# Patient Record
Sex: MALE | Race: WHITE
[De-identification: names, ages, dates, MRNs, and addresses within clinical notes are randomized per-mention and may not be internally consistent; named-entity substitution may affect disease eponyms.]

---

## 2021-10-29 ENCOUNTER — HOSPITAL ENCOUNTER (INPATIENT)
Dept: HOSPITAL 95 - ER | Age: 46
LOS: 5 days | Discharge: HOME | DRG: 177 | End: 2021-11-03
Attending: INTERNAL MEDICINE | Admitting: HOSPITALIST
Payer: COMMERCIAL

## 2021-10-29 VITALS — BODY MASS INDEX: 33.14 KG/M2 | HEIGHT: 69 IN | WEIGHT: 223.77 LBS

## 2021-10-29 DIAGNOSIS — I16.1: ICD-10-CM

## 2021-10-29 DIAGNOSIS — Z88.0: ICD-10-CM

## 2021-10-29 DIAGNOSIS — Z98.890: ICD-10-CM

## 2021-10-29 DIAGNOSIS — F15.10: ICD-10-CM

## 2021-10-29 DIAGNOSIS — I13.0: ICD-10-CM

## 2021-10-29 DIAGNOSIS — N17.9: ICD-10-CM

## 2021-10-29 DIAGNOSIS — I50.21: ICD-10-CM

## 2021-10-29 DIAGNOSIS — I21.4: ICD-10-CM

## 2021-10-29 DIAGNOSIS — I50.810: ICD-10-CM

## 2021-10-29 DIAGNOSIS — Z79.899: ICD-10-CM

## 2021-10-29 DIAGNOSIS — N18.30: ICD-10-CM

## 2021-10-29 DIAGNOSIS — U07.1: Primary | ICD-10-CM

## 2021-10-29 DIAGNOSIS — I26.99: ICD-10-CM

## 2021-10-29 DIAGNOSIS — R18.8: ICD-10-CM

## 2021-10-29 DIAGNOSIS — E87.6: ICD-10-CM

## 2021-10-29 LAB
CK SERPL-CCNC: 217 U/L (ref 39–308)
PROTHROMBIN TIME: 13 SEC (ref 9.7–11.5)
SARS-COV-2 RNA RESP QL NAA+PROBE: POSITIVE
TROPONIN I SERPL-MCNC: 0.07 NG/ML (ref 0–0.04)

## 2021-10-29 PROCEDURE — A9270 NON-COVERED ITEM OR SERVICE: HCPCS

## 2021-10-29 PROCEDURE — 8E0ZXY6 ISOLATION: ICD-10-PCS | Performed by: INTERNAL MEDICINE

## 2021-10-29 PROCEDURE — U0004 COV-19 TEST NON-CDC HGH THRU: HCPCS

## 2021-10-30 LAB
ALBUMIN SERPL BCP-MCNC: 1.7 G/DL (ref 3.4–5)
ALBUMIN SERPL BCP-MCNC: 1.8 G/DL (ref 3.4–5)
ALBUMIN/GLOB SERPL: 0.4 {RATIO} (ref 0.8–1.8)
ALT SERPL W P-5'-P-CCNC: 139 U/L (ref 12–78)
ANION GAP SERPL CALCULATED.4IONS-SCNC: 5 MMOL/L (ref 6–16)
ANION GAP SERPL CALCULATED.4IONS-SCNC: 5 MMOL/L (ref 6–16)
AST SERPL W P-5'-P-CCNC: 36 U/L (ref 12–37)
BASOPHILS # BLD AUTO: 0.06 K/MM3 (ref 0–0.23)
BASOPHILS NFR BLD AUTO: 1 % (ref 0–2)
BILIRUB SERPL-MCNC: 1.4 MG/DL (ref 0.1–1)
BUN SERPL-MCNC: 21 MG/DL (ref 8–24)
BUN SERPL-MCNC: 21 MG/DL (ref 8–24)
CALCIUM SERPL-MCNC: 8.1 MG/DL (ref 8.5–10.1)
CALCIUM SERPL-MCNC: 8.5 MG/DL (ref 8.5–10.1)
CHLORIDE SERPL-SCNC: 101 MMOL/L (ref 98–108)
CHLORIDE SERPL-SCNC: 99 MMOL/L (ref 98–108)
CHOLEST SERPL-MCNC: 125 MG/DL (ref 50–200)
CHOLEST/HDLC SERPL: 3.8 {RATIO}
CK SERPL-CCNC: 189 U/L (ref 39–308)
CO2 SERPL-SCNC: 29 MMOL/L (ref 21–32)
CO2 SERPL-SCNC: 29 MMOL/L (ref 21–32)
CREAT SERPL-MCNC: 1.37 MG/DL (ref 0.6–1.2)
CREAT SERPL-MCNC: 1.44 MG/DL (ref 0.6–1.2)
DEPRECATED RDW RBC AUTO: 47.8 FL (ref 35.1–46.3)
EOSINOPHIL # BLD AUTO: 0.01 K/MM3 (ref 0–0.68)
EOSINOPHIL NFR BLD AUTO: 0 % (ref 0–6)
ERYTHROCYTE [DISTWIDTH] IN BLOOD BY AUTOMATED COUNT: 16.3 % (ref 11.7–14.2)
GLOBULIN SER CALC-MCNC: 4.3 G/DL (ref 2.2–4)
GLUCOSE SERPL-MCNC: 114 MG/DL (ref 70–99)
GLUCOSE SERPL-MCNC: 119 MG/DL (ref 70–99)
HCT VFR BLD AUTO: 37.3 % (ref 37–53)
HDLC SERPL-MCNC: 33 MG/DL (ref 39–?)
HGB BLD-MCNC: 12.1 G/DL (ref 13.5–17.5)
IMM GRANULOCYTES # BLD AUTO: 0.06 K/MM3 (ref 0–0.1)
IMM GRANULOCYTES NFR BLD AUTO: 1 % (ref 0–1)
LDLC SERPL CALC-MCNC: 81 MG/DL (ref 0–110)
LDLC/HDLC SERPL: 2.4 {RATIO}
LYMPHOCYTES # BLD AUTO: 1.99 K/MM3 (ref 0.84–5.2)
LYMPHOCYTES NFR BLD AUTO: 15 % (ref 21–46)
MAGNESIUM SERPL-MCNC: 1.7 MG/DL (ref 1.6–2.4)
MCHC RBC AUTO-ENTMCNC: 32.4 G/DL (ref 31.5–36.5)
MCV RBC AUTO: 81 FL (ref 80–100)
MONOCYTES # BLD AUTO: 1.62 K/MM3 (ref 0.16–1.47)
MONOCYTES NFR BLD AUTO: 12 % (ref 4–13)
NEUTROPHILS # BLD AUTO: 9.5 K/MM3 (ref 1.96–9.15)
NEUTROPHILS NFR BLD AUTO: 72 % (ref 41–73)
NRBC # BLD AUTO: 0 K/MM3 (ref 0–0.02)
NRBC BLD AUTO-RTO: 0 /100 WBC (ref 0–0.2)
PHOSPHATE SERPL-MCNC: 4 MG/DL (ref 2.5–4.9)
PLATELET # BLD AUTO: 447 K/MM3 (ref 150–400)
POTASSIUM SERPL-SCNC: 3.3 MMOL/L (ref 3.5–5.5)
POTASSIUM SERPL-SCNC: 3.5 MMOL/L (ref 3.5–5.5)
PROT SERPL-MCNC: 6.1 G/DL (ref 6.4–8.2)
PROT UR STRIP-MCNC: (no result) MG/DL
SODIUM SERPL-SCNC: 133 MMOL/L (ref 136–145)
SODIUM SERPL-SCNC: 135 MMOL/L (ref 136–145)
SP GR SPEC: 1 (ref 1–1.02)
TRIGL SERPL-MCNC: 57 MG/DL (ref 30–160)
TROPONIN I SERPL-MCNC: 0.04 NG/ML (ref 0–0.04)
TROPONIN I SERPL-MCNC: 0.07 NG/ML (ref 0–0.04)
UROBILINOGEN UR STRIP-MCNC: (no result) MG/DL
VLDLC SERPL CALC-MCNC: 11 MG/DL (ref 6–32)

## 2021-10-30 NOTE — NUR
BLOOD PRESSURE ELEVATED AND NOT RESPONDING TO HYDRALZINE. PHYSCIAN CALLED AND
ORDEREDS RECV'D. B/P NOT RESPONDING TO SECOND DOSE OF HYDRALZINE, RASHID DRIP
STARTED. TITRATED UP TO MAX DOSE.
MULTIPLE EDUCATION AND QUESTIONS ANSWERED CONCERNING DX AND TX PLAN THROUGH
NIGHT.
RESPONDED WELL TO LASIX WITH OVER 5000 ML URINE OUTPUT.

## 2021-10-30 NOTE — NUR
AOx4, denies P/N/V this shift, calm/cooperative, diet advanced to renal/2gm
salt restriction/1.5l fluid restriction tolerates well, ST 100s, BP HTN hard
to control between 140-160s this shift, nitro gtt attempt to titrate down
unsucessful despite bridging to PO antihypertensives coreg/lisinopril, nitro
lowest at 180mics/min, +2 pulses throughout, edema BLE +4 pitting, 40mg lasix
given, echo completed EF 20%, lungs clear/dim, denies SOB on RA, firm/rounded
abdomen, intermittant bowel tones non-tender, pt reports previous dark stool
before admit continue to monitor.

## 2021-10-31 LAB
ALBUMIN SERPL BCP-MCNC: 1.7 G/DL (ref 3.4–5)
AMPHETAMINES UR SCN-MCNC: DETECTED NG/ML
AMPHETAMINES UR-MCNC: DETECTED UG/L
ANION GAP SERPL CALCULATED.4IONS-SCNC: 5 MMOL/L (ref 6–16)
BASOPHILS # BLD AUTO: 0.03 K/MM3 (ref 0–0.23)
BASOPHILS NFR BLD AUTO: 0 % (ref 0–2)
BUN SERPL-MCNC: 20 MG/DL (ref 8–24)
CALCIUM SERPL-MCNC: 8.4 MG/DL (ref 8.5–10.1)
CHLORIDE SERPL-SCNC: 99 MMOL/L (ref 98–108)
CO2 SERPL-SCNC: 28 MMOL/L (ref 21–32)
CREAT SERPL-MCNC: 1.4 MG/DL (ref 0.6–1.2)
DEPRECATED RDW RBC AUTO: 48.3 FL (ref 35.1–46.3)
EOSINOPHIL # BLD AUTO: 0.04 K/MM3 (ref 0–0.68)
EOSINOPHIL NFR BLD AUTO: 0 % (ref 0–6)
ERYTHROCYTE [DISTWIDTH] IN BLOOD BY AUTOMATED COUNT: 16.4 % (ref 11.7–14.2)
GLUCOSE SERPL-MCNC: 101 MG/DL (ref 70–99)
HCT VFR BLD AUTO: 36.2 % (ref 37–53)
HGB BLD-MCNC: 11.6 G/DL (ref 13.5–17.5)
IMM GRANULOCYTES # BLD AUTO: 0.06 K/MM3 (ref 0–0.1)
IMM GRANULOCYTES NFR BLD AUTO: 0 % (ref 0–1)
LYMPHOCYTES # BLD AUTO: 1.47 K/MM3 (ref 0.84–5.2)
LYMPHOCYTES NFR BLD AUTO: 11 % (ref 21–46)
MAGNESIUM SERPL-MCNC: 1.7 MG/DL (ref 1.6–2.4)
MCHC RBC AUTO-ENTMCNC: 32 G/DL (ref 31.5–36.5)
MCV RBC AUTO: 82 FL (ref 80–100)
MONOCYTES # BLD AUTO: 1.54 K/MM3 (ref 0.16–1.47)
MONOCYTES NFR BLD AUTO: 11 % (ref 4–13)
NEUTROPHILS # BLD AUTO: 10.48 K/MM3 (ref 1.96–9.15)
NEUTROPHILS NFR BLD AUTO: 77 % (ref 41–73)
NRBC # BLD AUTO: 0 K/MM3 (ref 0–0.02)
NRBC BLD AUTO-RTO: 0 /100 WBC (ref 0–0.2)
PHOSPHATE SERPL-MCNC: 4 MG/DL (ref 2.5–4.9)
PLATELET # BLD AUTO: 383 K/MM3 (ref 150–400)
POTASSIUM SERPL-SCNC: 3.3 MMOL/L (ref 3.5–5.5)
SODIUM SERPL-SCNC: 132 MMOL/L (ref 136–145)
TROPONIN I SERPL-MCNC: 0.03 NG/ML (ref 0–0.04)

## 2021-10-31 NOTE — NUR
Review of pt with nursing will follow up with family after intervention and
plan of care from cardiology.

## 2021-10-31 NOTE — NUR
AOx4, denies P/N/V this shift, pupils 2mm, NSR 80/90s no ectopy noted, BP WNL
with oral meds, lasix given with moderate results, +3 edema still present BL
feet/ankle/legs, heparin gtt increased to 21units/hr, given 20meq for 3.3k,
continues 1l NC when sleeping, claer lungs all fields, denies SOB, audible
bowels/non-tender, awaiting stool sample for blood occult test, recent
reported Hx dark stools, LBM 10/29 per Pt, UDS+ for methamphetamines despite
Pt denying, admitted to recent use after the fact, planned angiogram in AM,
NPO at midnight, consent signed.

## 2021-10-31 NOTE — NUR
ASSUMED CARE
 
REPORT RECEIVED FROM DAY SHIFT RN. PT IN BED, AAOX4. PT DENIES ANY PAIN OR
DISCOMFORT. RESPIRATIONS EVEN AND UNLABORED, ON 2L NC. SUPPLEMENTAL OXYGEN FOR
COMFORT WHEN PT DOES HAVE COUGHING EPISODES. PT REMAINS ON HEPARIN GTT FOR PE.
HEPARIN GTT INFUSING AT 21UNITS/KG/HR. NO ACUTE DISTRESS NOTED. SEE SHIFT
ASSESSMENT.

## 2021-11-01 LAB
ALBUMIN SERPL BCP-MCNC: 1.5 G/DL (ref 3.4–5)
ANION GAP SERPL CALCULATED.4IONS-SCNC: 6 MMOL/L (ref 6–16)
BASOPHILS # BLD AUTO: 0.03 K/MM3 (ref 0–0.23)
BASOPHILS NFR BLD AUTO: 0 % (ref 0–2)
BUN SERPL-MCNC: 23 MG/DL (ref 8–24)
CALCIUM SERPL-MCNC: 8.3 MG/DL (ref 8.5–10.1)
CHLORIDE SERPL-SCNC: 99 MMOL/L (ref 98–108)
CO2 SERPL-SCNC: 29 MMOL/L (ref 21–32)
CREAT SERPL-MCNC: 1.45 MG/DL (ref 0.6–1.2)
DEPRECATED RDW RBC AUTO: 49.1 FL (ref 35.1–46.3)
EOSINOPHIL # BLD AUTO: 0.09 K/MM3 (ref 0–0.68)
EOSINOPHIL NFR BLD AUTO: 1 % (ref 0–6)
ERYTHROCYTE [DISTWIDTH] IN BLOOD BY AUTOMATED COUNT: 16.7 % (ref 11.7–14.2)
GLUCOSE SERPL-MCNC: 99 MG/DL (ref 70–99)
HCT VFR BLD AUTO: 36.5 % (ref 37–53)
HGB BLD-MCNC: 11.8 G/DL (ref 13.5–17.5)
IMM GRANULOCYTES # BLD AUTO: 0.04 K/MM3 (ref 0–0.1)
IMM GRANULOCYTES NFR BLD AUTO: 0 % (ref 0–1)
LYMPHOCYTES # BLD AUTO: 1.77 K/MM3 (ref 0.84–5.2)
LYMPHOCYTES NFR BLD AUTO: 12 % (ref 21–46)
MAGNESIUM SERPL-MCNC: 1.8 MG/DL (ref 1.6–2.4)
MCHC RBC AUTO-ENTMCNC: 32.3 G/DL (ref 31.5–36.5)
MCV RBC AUTO: 82 FL (ref 80–100)
MONOCYTES # BLD AUTO: 2.04 K/MM3 (ref 0.16–1.47)
MONOCYTES NFR BLD AUTO: 14 % (ref 4–13)
NEUTROPHILS # BLD AUTO: 10.82 K/MM3 (ref 1.96–9.15)
NEUTROPHILS NFR BLD AUTO: 73 % (ref 41–73)
NRBC # BLD AUTO: 0 K/MM3 (ref 0–0.02)
NRBC BLD AUTO-RTO: 0 /100 WBC (ref 0–0.2)
PHOSPHATE SERPL-MCNC: 4.3 MG/DL (ref 2.5–4.9)
PLATELET # BLD AUTO: 430 K/MM3 (ref 150–400)
POTASSIUM SERPL-SCNC: 3.4 MMOL/L (ref 3.5–5.5)
SODIUM SERPL-SCNC: 134 MMOL/L (ref 136–145)

## 2021-11-01 NOTE — NUR
ASSUMED CARE OF PATIENT THIS AM AT APPROX 0700. PT ALERT, ORIENTED x4; CALM
AND COOPERATIVE WITH CARE. PT RESTING IN BED, 1 PERSON ASSIST TO BSC. PT
DENIES PAIN, CHEST PAIN, AND NAUSEA. PT REPORTS NONPRODUCTIVE COUGH, STATES
FEELS DIZZINESS AND PAIN WITH COUGHING. SPO2 >90% ON 2L O2 VIA NC. SWELLING
NOTED TO BLE FROM ANKLE TO HIP AND GENITALIA, PT RECEIVING LASIX. NEEDING
STOOL SAMPLE FOR GUAIAC, PRIOR TO ANGIO, PT GIVEN PRUNE JUICE PER REQUEST.
VSS. NO OTHER ACUTE CHANGES NOTED. WILL CONTINUE TO MONITOR UNTIL REPORT GIVEN
TO ONCOMING RN.

## 2021-11-02 LAB
ALBUMIN SERPL BCP-MCNC: 1.4 G/DL (ref 3.4–5)
ANION GAP SERPL CALCULATED.4IONS-SCNC: 7 MMOL/L (ref 6–16)
BASOPHILS # BLD AUTO: 0.03 K/MM3 (ref 0–0.23)
BASOPHILS NFR BLD AUTO: 0 % (ref 0–2)
BUN SERPL-MCNC: 26 MG/DL (ref 8–24)
CALCIUM SERPL-MCNC: 8.2 MG/DL (ref 8.5–10.1)
CHLORIDE SERPL-SCNC: 99 MMOL/L (ref 98–108)
CO2 SERPL-SCNC: 30 MMOL/L (ref 21–32)
CREAT SERPL-MCNC: 1.49 MG/DL (ref 0.6–1.2)
DEPRECATED RDW RBC AUTO: 48.9 FL (ref 35.1–46.3)
EOSINOPHIL # BLD AUTO: 0.1 K/MM3 (ref 0–0.68)
EOSINOPHIL NFR BLD AUTO: 1 % (ref 0–6)
ERYTHROCYTE [DISTWIDTH] IN BLOOD BY AUTOMATED COUNT: 16.5 % (ref 11.7–14.2)
GLUCOSE SERPL-MCNC: 97 MG/DL (ref 70–99)
HCT VFR BLD AUTO: 37 % (ref 37–53)
HGB BLD-MCNC: 11.7 G/DL (ref 13.5–17.5)
IMM GRANULOCYTES # BLD AUTO: 0.07 K/MM3 (ref 0–0.1)
IMM GRANULOCYTES NFR BLD AUTO: 1 % (ref 0–1)
LYMPHOCYTES # BLD AUTO: 2.16 K/MM3 (ref 0.84–5.2)
LYMPHOCYTES NFR BLD AUTO: 15 % (ref 21–46)
MCHC RBC AUTO-ENTMCNC: 31.6 G/DL (ref 31.5–36.5)
MCV RBC AUTO: 82 FL (ref 80–100)
MONOCYTES # BLD AUTO: 1.88 K/MM3 (ref 0.16–1.47)
MONOCYTES NFR BLD AUTO: 13 % (ref 4–13)
NEUTROPHILS # BLD AUTO: 10.59 K/MM3 (ref 1.96–9.15)
NEUTROPHILS NFR BLD AUTO: 71 % (ref 41–73)
NRBC # BLD AUTO: 0 K/MM3 (ref 0–0.02)
NRBC BLD AUTO-RTO: 0 /100 WBC (ref 0–0.2)
PHOSPHATE SERPL-MCNC: 3.9 MG/DL (ref 2.5–4.9)
PLATELET # BLD AUTO: 470 K/MM3 (ref 150–400)
POTASSIUM SERPL-SCNC: 3.4 MMOL/L (ref 3.5–5.5)
SODIUM SERPL-SCNC: 136 MMOL/L (ref 136–145)

## 2021-11-02 NOTE — NUR
TRANSFER NOTE
REPORT GIVEN TO TIA RN APPROX 1840. THIS NURSE EDUCATED PT ON MEDICATION
REGIMEN AND DIET PER PATIENT REQUEST. HANDOUTS SENT UPSTAIRS WITH PATIENT. ALL
OF PATIENT BELONGINGS WERE SENT WITH PATIENT. VITAL SIGNS REMAINED STABLE
THROUGHOUT SHIFT. PT TRANSFERED VIA WHEELCHAIR, 2L O2 VIA NASAL CANNULA WITH
BARBIE CNA.

## 2021-11-02 NOTE — NUR
returned call to [facility] patients SO. Review of pt needs with nursing. Pt to DC
will folllow up with SO on his disease process and education so she can hlep
him with his care.

## 2021-11-02 NOTE — NUR
ASSSUMPTION OF CARE NOTE
PT IS ALERT AND ORIENTED X 4. SPO2 IN MID 90'S ON ROOM AIR. PT UTILIZES
BEDSIDE URINAL, BOTH LEFT AND RIGHT AC IV'S ARE SALINE LOCKED. PT DENIED CHEST
PAIN/PRESSURE. WILL CONTINUE TO MONITOR. BED IN LOW, CALL LIGHT IN REACH.

## 2021-11-02 NOTE — NUR
SHIFT SUMMARY
ASSUMED CARE OF PT AT 1900. PT IS A/OX4. HEART SOUDS REGULAR. PT HAS SWELLING
IN HIS LEGS.  LUNG SOUNDS ARE VERY DIMINISHED AND TIGHT AT THE BASES. PT
THINKS ITS FROM ALL THE FLUID IN HIS LUNGS. PT WAS ON 2L NC AT THE BEGINNING
OF THE SHIFT BUT NOW HE IS ON RA WITH SATURATIONS ABOVE 95%.PT USED THE URINAL
T/O THE NIGHT. PT HAS BEEN NPO SINCE MIDNIGHT.
 
CALL LIGHT IN REACH, BED IN LOWEST POSTION.

## 2021-11-03 LAB
ALBUMIN SERPL BCP-MCNC: 1.5 G/DL (ref 3.4–5)
ANION GAP SERPL CALCULATED.4IONS-SCNC: 6 MMOL/L (ref 6–16)
BASOPHILS # BLD AUTO: 0.04 K/MM3 (ref 0–0.23)
BASOPHILS NFR BLD AUTO: 0 % (ref 0–2)
BUN SERPL-MCNC: 27 MG/DL (ref 8–24)
CALCIUM SERPL-MCNC: 8.5 MG/DL (ref 8.5–10.1)
CHLORIDE SERPL-SCNC: 99 MMOL/L (ref 98–108)
CO2 SERPL-SCNC: 30 MMOL/L (ref 21–32)
CREAT SERPL-MCNC: 1.56 MG/DL (ref 0.6–1.2)
DEPRECATED RDW RBC AUTO: 49.1 FL (ref 35.1–46.3)
EOSINOPHIL # BLD AUTO: 0.15 K/MM3 (ref 0–0.68)
EOSINOPHIL NFR BLD AUTO: 1 % (ref 0–6)
ERYTHROCYTE [DISTWIDTH] IN BLOOD BY AUTOMATED COUNT: 16.5 % (ref 11.7–14.2)
GLUCOSE SERPL-MCNC: 140 MG/DL (ref 70–99)
HCT VFR BLD AUTO: 37.6 % (ref 37–53)
HGB BLD-MCNC: 12 G/DL (ref 13.5–17.5)
IMM GRANULOCYTES # BLD AUTO: 0.06 K/MM3 (ref 0–0.1)
IMM GRANULOCYTES NFR BLD AUTO: 1 % (ref 0–1)
LYMPHOCYTES # BLD AUTO: 2.16 K/MM3 (ref 0.84–5.2)
LYMPHOCYTES NFR BLD AUTO: 17 % (ref 21–46)
MCHC RBC AUTO-ENTMCNC: 31.9 G/DL (ref 31.5–36.5)
MCV RBC AUTO: 83 FL (ref 80–100)
MONOCYTES # BLD AUTO: 1.39 K/MM3 (ref 0.16–1.47)
MONOCYTES NFR BLD AUTO: 11 % (ref 4–13)
NEUTROPHILS # BLD AUTO: 9.09 K/MM3 (ref 1.96–9.15)
NEUTROPHILS NFR BLD AUTO: 70 % (ref 41–73)
NRBC # BLD AUTO: 0 K/MM3 (ref 0–0.02)
NRBC BLD AUTO-RTO: 0 /100 WBC (ref 0–0.2)
PHOSPHATE SERPL-MCNC: 3.3 MG/DL (ref 2.5–4.9)
PLATELET # BLD AUTO: 478 K/MM3 (ref 150–400)
POTASSIUM SERPL-SCNC: 3.3 MMOL/L (ref 3.5–5.5)
SODIUM SERPL-SCNC: 135 MMOL/L (ref 136–145)

## 2021-11-03 NOTE — NUR
DISCHARGE SUMMARY
 
PATIENT HAD NO ACUTE EVENTS THIS SHIFT. VITAL SIGNS REVIEWED. IV AND TELE
REMOVED. PATIENT WHEELED OUT BY CATERINA ACOSTA. PATIENTS MOTHER PICKED UP
PATIENT WITHOUT INCIDENT. PATIENT VERBALIZED UNDERSTANDING OF DISCHARGE
INFORMATION.

## 2021-12-04 ENCOUNTER — HOSPITAL ENCOUNTER (EMERGENCY)
Dept: HOSPITAL 95 - ER | Age: 46
Discharge: HOME | End: 2021-12-04
Payer: COMMERCIAL

## 2021-12-04 VITALS — BODY MASS INDEX: 27.92 KG/M2 | WEIGHT: 195 LBS | HEIGHT: 70 IN
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
## 2021-12-04 DIAGNOSIS — R55: Primary | ICD-10-CM

## 2021-12-04 DIAGNOSIS — I25.2: ICD-10-CM

## 2021-12-04 DIAGNOSIS — I11.0: ICD-10-CM

## 2021-12-04 DIAGNOSIS — Z86.16: ICD-10-CM

## 2021-12-04 DIAGNOSIS — Z79.82: ICD-10-CM

## 2021-12-04 DIAGNOSIS — I16.1: ICD-10-CM

## 2021-12-04 DIAGNOSIS — S00.81XA: ICD-10-CM

## 2021-12-04 DIAGNOSIS — Z79.899: ICD-10-CM

## 2021-12-04 DIAGNOSIS — I50.21: ICD-10-CM

## 2021-12-04 DIAGNOSIS — Z88.0: ICD-10-CM

## 2021-12-04 LAB
ALBUMIN SERPL BCP-MCNC: 3.6 G/DL (ref 3.4–5)
ALBUMIN/GLOB SERPL: 0.8 {RATIO} (ref 0.8–1.8)
ALT SERPL W P-5'-P-CCNC: 26 U/L (ref 12–78)
ANION GAP SERPL CALCULATED.4IONS-SCNC: 10 MMOL/L (ref 6–16)
AST SERPL W P-5'-P-CCNC: 10 U/L (ref 12–37)
BASOPHILS # BLD AUTO: 0.05 K/MM3 (ref 0–0.23)
BASOPHILS NFR BLD AUTO: 1 % (ref 0–2)
BILIRUB SERPL-MCNC: 0.4 MG/DL (ref 0.1–1)
BUN SERPL-MCNC: 59 MG/DL (ref 8–24)
CALCIUM SERPL-MCNC: 9.8 MG/DL (ref 8.5–10.1)
CHLORIDE SERPL-SCNC: 99 MMOL/L (ref 98–108)
CO2 SERPL-SCNC: 26 MMOL/L (ref 21–32)
CREAT SERPL-MCNC: 2.06 MG/DL (ref 0.6–1.2)
DEPRECATED RDW RBC AUTO: 48.9 FL (ref 35.1–46.3)
EOSINOPHIL # BLD AUTO: 0.28 K/MM3 (ref 0–0.68)
EOSINOPHIL NFR BLD AUTO: 3 % (ref 0–6)
ERYTHROCYTE [DISTWIDTH] IN BLOOD BY AUTOMATED COUNT: 17.2 % (ref 11.7–14.2)
GLOBULIN SER CALC-MCNC: 4.4 G/DL (ref 2.2–4)
GLUCOSE SERPL-MCNC: 121 MG/DL (ref 70–99)
HCT VFR BLD AUTO: 42.2 % (ref 37–53)
HGB BLD-MCNC: 14.2 G/DL (ref 13.5–17.5)
IMM GRANULOCYTES # BLD AUTO: 0.04 K/MM3 (ref 0–0.1)
IMM GRANULOCYTES NFR BLD AUTO: 0 % (ref 0–1)
LYMPHOCYTES # BLD AUTO: 3.4 K/MM3 (ref 0.84–5.2)
LYMPHOCYTES NFR BLD AUTO: 34 % (ref 21–46)
MCHC RBC AUTO-ENTMCNC: 33.6 G/DL (ref 31.5–36.5)
MCV RBC AUTO: 79 FL (ref 80–100)
MONOCYTES # BLD AUTO: 1.08 K/MM3 (ref 0.16–1.47)
MONOCYTES NFR BLD AUTO: 11 % (ref 4–13)
NEUTROPHILS # BLD AUTO: 5.17 K/MM3 (ref 1.96–9.15)
NEUTROPHILS NFR BLD AUTO: 52 % (ref 41–73)
NRBC # BLD AUTO: 0 K/MM3 (ref 0–0.02)
NRBC BLD AUTO-RTO: 0 /100 WBC (ref 0–0.2)
PLATELET # BLD AUTO: 335 K/MM3 (ref 150–400)
POTASSIUM SERPL-SCNC: 4.7 MMOL/L (ref 3.5–5.5)
PROT SERPL-MCNC: 8 G/DL (ref 6.4–8.2)
SODIUM SERPL-SCNC: 135 MMOL/L (ref 136–145)
TROPONIN I SERPL-MCNC: <0.015 NG/ML (ref 0–0.04)

## 2025-02-21 NOTE — NUR
SHIFT SUMMARY
 
PT ADMITTED FROM ER FOR PNEUMONIA, CHF EXAC, AND ABD PAIN/BLOATING.  PT C/O
 WORSENING ABDOMINAL BLOATING SINCE COMING IN TO THE ER.  TUMS GIVEN WITHOUT
RELIEF OF ABD DISCOMFORT.  ORDER OBTAINED FOR SIMETHICONE WITH SOME RELIEF TO
ABD DISCOMFORT.  ORDER ALSO OBTAINED FOR CT TO ABD/PELVIS- AWAITING TEST TO BE
DONE. PRN SVN GIVEN PER RT, AND PRN HTN MEDS PER EMAR.  PT INTERMITTENTLY
MOANING IN ROOM DUE TO ABD DISCOMFORT.  S.O., ALKESEY AT BEDSIDE.  PT WITH
CONTINUOUS PULSE OX AND O2 SATS > 92%. PT DECLINED TO CHANGE INTO HOSPITAL
GOWN, AND IS LYING IN BED WITH JEANS/ T-SHIRT.  BED IN LOWEST POSITION, CALL
LIGHT WITHIN REACH, SIDERAILS UP X2.

## 2025-02-21 NOTE — NUR
SHIFT SUMMARY
PT CONT LEVEL OF CARE. PT NOTED TO BE A&OX4 AND ASSIST X1 WITH AMBULATION. PT
WIFE NOTED TO BE AT BEDSIDE. PT CONT WITH IV DIURETICS. BP CONT TO REMAIN
ELEVATED AND PT WAS STARTED ON NEW BP MEDICATION THIS SHIFT. PT RECEIVED ECHO
AND ABD CT THIS SHIFT AWAITING PHYSICIAN TO DISCUSS RESULTS WITH PT AND PT
WIFE. PT NOTED TO CONT TO VOICE C/O ABD PAIN AND BLOATING PRN SIMETHICONE
GIVEN X1 THIS SHIFT WITH EFFECTIVENESS. PT NOTED TO CALL APPROPRIATELY AND IS
COOPERATIVE WITH CARE.

## 2025-02-22 NOTE — NUR
AT 2215, PT C/O SEVERE NAUSEA, STARTING ABRUPTLY.  PROVIDER NOTIFIED AND PRN
ZOFRAN ORDERED AND GIVEN WITH SOME RELIEF.  PT STILL C/O ABD BLOATING AND
DISCOMFORT.  PRN SIMETHICONE GIVEN. UPON PT DISCUSSION WITH CHARGE RN, SHAAN,
IT SEEMED THAT HIS ABDOMINAL ISSUES STARTED AT HOME AFTER HE BEGAN PO
AZITHROMYCIN.  TONIGHT, HIS SYMPTOMS STARTED WHEN THE IV AZITHROMYCIN WAS
ALMOST COMPLETE.  MD AND PHARMACIST NOTIFIED. DR. JIANG CAME TO BEDSIDE.
AZITHROMYCIN NOW INCLUDED ON ALLERGY LIST. PT CURRENTLY RESTING, STATING ABD
DISCOMFORT IS SUBSIDING.

## 2025-02-22 NOTE — NUR
SHIFT SUMMARY
PT CONT LEVEL OF CARE. PT A&O X4 AND IND WITH AMBULATION. PT STARTED ON NEW
MEDICATION TO HELP CONTROL CHF THIS SHIFT. POSSIBLE DC TOMORROW. PT NOTED TO
AMBULATE IN HALLS.

## 2025-02-22 NOTE — NUR
SHIFT SUMMARY
 
PT DID WELL THROUGH THE NIGHT EXCEPT FOR APPROX 2 HOURS STARTING AS THE IV
AZITHROMYCIN WAS COMPLETING.  SEE PRIOR NOTE- AZITHROMYCIN NOW ON ALLERGY
LIST.  PT SLEPT INTERMITTENTLY THROUGH THE NIGHT.  BLOOD PRESSURE REMAINS
ELEVATED- MEDICATED PER EMAR.  S.O. AT BEDSIDE THROUGH THE NIGHT.  BED IN
LOWEST POSITION, CALL LIGHT WITHIN REACH, SIDERAILS UP X2.

## 2025-02-23 NOTE — NUR
SHIFT SUMMARY
 
PATIENT HAS SLEPT INTERMITTANTLY THROUGHOUT THE NIGHT.  HE IS ANTICIPATING
DISCHARGE HOME TODAY.  PATIENT HAS NOT HAD ANY COMPLAINTS TONIGHT AND HAS BEEN
OUT AMBULATING IN THE HALLWAYS.  PATIENT IS ORIENTED X4.  HE HAS HIS CALL
LIGHT WITHIN REACH.  SAFETY PRECAUTIONS ARE BEING MAINTAINED.

## 2025-02-23 NOTE — NUR
DISCHARGE SUMMARY
PT DC THIS SHIFT DC INSTRUCTION GONE OVER WITH PT AND PT SO, WHOM STATED
UNDERSTANDING. PT DECLINED TO HAVE STAFF ESCORT OUT. PT WAS WALKED TO ELEVATOR
WITH A STEADY GAIT NOTED.